# Patient Record
Sex: MALE | Race: ASIAN | NOT HISPANIC OR LATINO | Employment: FULL TIME | URBAN - METROPOLITAN AREA
[De-identification: names, ages, dates, MRNs, and addresses within clinical notes are randomized per-mention and may not be internally consistent; named-entity substitution may affect disease eponyms.]

---

## 2019-09-13 ENCOUNTER — TELEPHONE (OUTPATIENT)
Dept: ORTHOPEDICS | Facility: CLINIC | Age: 49
End: 2019-09-13

## 2019-09-13 NOTE — TELEPHONE ENCOUNTER
Spoke with pt's wife informing her due to this injury pt will have to see Dr Yunier Madera (W/C doctor).  Pt understood.  Message sent to Dr Madera's office to call pt to schedule.

## 2019-09-13 NOTE — TELEPHONE ENCOUNTER
----- Message from Ling Kwan sent at 9/13/2019  1:42 PM CDT -----  Contact: AUSTIN REDDY [[id number]]  Name of Who is Calling: AUSTIN REDDY [[id number]]      What is the request in detail: Patient called requesting to be seen.  Patient has right fracture to his hand. Fractured on September 11, 2019.  Please give a call back at your earliest convenience.  THANKS!      Can the clinic reply by [facility]: no      What Number to Call Back: AUSTIN REDDY / ph# [phone]

## 2019-09-13 NOTE — TELEPHONE ENCOUNTER
----- Message from Mita Beyer sent at 9/13/2019  4:24 PM CDT -----  Contact: wife Jennifer [phone]  Type:  Sooner Appointment Request     Caller is requesting a sooner appointment.  Caller declined first available appointment listed below.  Caller will not accept being placed on the waitlist and is requesting a message be sent to doctor.  Name of Caller: Pt's wife   When is the first available appointment? 10-07-19  Symptoms or Reason:  Fractured wrist   Would the patient rather a call back or a response via [facility]? Call back  Best Call Back Number: [phone]  Additional Information:

## 2019-09-18 ENCOUNTER — TELEPHONE (OUTPATIENT)
Dept: ORTHOPEDICS | Facility: CLINIC | Age: 49
End: 2019-09-18

## 2019-09-18 NOTE — TELEPHONE ENCOUNTER
----- Message from Mita Beyer sent at 9/13/2019  4:24 PM CDT -----  Contact: wife Jennifer [phone]  Type:  Sooner Appointment Request     Caller is requesting a sooner appointment.  Caller declined first available appointment listed below.  Caller will not accept being placed on the waitlist and is requesting a message be sent to doctor.  Name of Caller: Pt's wife   When is the first available appointment? 10-07-19  Symptoms or Reason:  Fractured wrist   Would the patient rather a call back or a response via [facility]? Call back  Best Call Back Number: [phone]  Additional Information:

## 2019-09-18 NOTE — TELEPHONE ENCOUNTER
No W/C information in system regarding authorization.  Left message for patient to contact office.

## 2019-09-18 NOTE — TELEPHONE ENCOUNTER
----- Message from Amarilis Loya MA sent at 9/13/2019  4:21 PM CDT -----  Contact: AUSTIN REDDY [[id number]]  This pt will be a work comp.  Can you please schedule this pt for an appt next week.  Thanks Amarilis  ----- Message -----  From: Ling Kwan  Sent: 9/13/2019   1:42 PM  To: [facility] Staff    Name of Who is Calling: AUSTIN REDDY [[id number]]      What is the request in detail: Patient called requesting to be seen.  Patient has right fracture to his hand. Fractured on September 11, 2019.  Please give a call back at your earliest convenience.  THANKS!      Can the clinic reply by [facility]: no      What Number to Call Back: AUSTIN REDDY / ph# [phone]

## 2019-09-20 NOTE — TELEPHONE ENCOUNTER
Attempted to contact patient again, spoke with his wife, she informed me patient was evaluated at [facility] and he is currently under orthopedist care.  Grateful for the call.